# Patient Record
Sex: FEMALE | Race: WHITE | NOT HISPANIC OR LATINO | URBAN - METROPOLITAN AREA
[De-identification: names, ages, dates, MRNs, and addresses within clinical notes are randomized per-mention and may not be internally consistent; named-entity substitution may affect disease eponyms.]

---

## 2020-07-25 ENCOUNTER — TELEPHONE ENCOUNTER (OUTPATIENT)
Dept: URBAN - METROPOLITAN AREA CLINIC 13 | Facility: CLINIC | Age: 70
End: 2020-07-25

## 2020-07-25 RX ORDER — THYROID, PORCINE 60 MG/1
TAKE 1 TABLET BY MOUTH EVERY DAY TABLET ORAL
Qty: 30 | Refills: 0 | OUTPATIENT
Start: 2011-12-13 | End: 2016-02-29

## 2020-07-25 RX ORDER — POLYETHYLENE GLYCOL 3350, SODIUM CHLORIDE, SODIUM BICARBONATE AND POTASSIUM CHLORIDE WITH LEMON FLAVOR 420; 11.2; 5.72; 1.48 G/4L; G/4L; G/4L; G/4L
TAKE 1/2 GALLON AT 5:00 PM DAY BEFORE PROCEDURE, TAKE SECOND 1/2 OF GALLON 6 HRS PRIOR TO PROCEDURE POWDER, FOR SOLUTION ORAL
Qty: 1 | Refills: 0 | OUTPATIENT
Start: 2016-03-10 | End: 2016-03-21

## 2020-07-25 RX ORDER — THYROID, PORCINE 30 MG/1
TAKE 1 TABLET BY MOUTH EVERY DAY TABLET ORAL
Qty: 30 | Refills: 0 | OUTPATIENT
Start: 2011-06-14 | End: 2016-02-29

## 2020-07-26 ENCOUNTER — TELEPHONE ENCOUNTER (OUTPATIENT)
Dept: URBAN - METROPOLITAN AREA CLINIC 13 | Facility: CLINIC | Age: 70
End: 2020-07-26

## 2020-07-26 RX ORDER — ESTERIFIED ESTROGENS AND METHYLTESTOSTERONE .625; 1.25 MG/1; MG/1
TAKE 1 TABLET DAILY TABLET ORAL
Refills: 0 | Status: ACTIVE | COMMUNITY
Start: 2016-02-29

## 2020-07-26 RX ORDER — HYDROCHLOROTHIAZIDE 25 MG/1
TAKE 1 TABLET BY MOUTH EVERY DAY TABLET ORAL
Qty: 30 | Refills: 0 | Status: ACTIVE | COMMUNITY
Start: 2011-06-14

## 2020-07-26 RX ORDER — DICLOFENAC SODIUM 1 %
GEL (GRAM) TOPICAL
Qty: 255 | Refills: 0 | Status: ACTIVE | COMMUNITY
Start: 2011-12-13

## 2020-07-26 RX ORDER — HYDROCHLOROTHIAZIDE 12.5 MG/1
CAPSULE ORAL
Qty: 30 | Refills: 0 | Status: ACTIVE | COMMUNITY
Start: 2011-12-13

## 2020-07-26 RX ORDER — AZITHROMYCIN DIHYDRATE 250 MG/1
TAKE AS DIRECTED TABLET, FILM COATED ORAL
Qty: 6 | Refills: 0 | Status: ACTIVE | COMMUNITY
Start: 2011-06-14

## 2020-07-26 RX ORDER — ESOMEPRAZOLE MAGNESIUM 40 MG
TAKE ONE CAPSULE BY MOUTH EVERY DAY CAPSULE,DELAYED RELEASE (ENTERIC COATED) ORAL
Qty: 30 | Refills: 0 | Status: ACTIVE | COMMUNITY
Start: 2011-12-13

## 2020-07-26 RX ORDER — HYDROCODONE BITARTRATE AND HOMATROPINE METHYLBROMIDE 5; 1.5 MG/5ML; MG/5ML
SYRUP ORAL
Qty: 120 | Refills: 0 | Status: ACTIVE | COMMUNITY
Start: 2016-02-26

## 2020-07-26 RX ORDER — NITROFURANTOIN MONOHYDRATE/MACROCRYSTALLINE 25; 75 MG/1; MG/1
CAPSULE ORAL
Qty: 14 | Refills: 0 | Status: ACTIVE | COMMUNITY
Start: 2011-04-24

## 2020-07-26 RX ORDER — SIMVASTATIN 20 MG/1
TAKE 1 TABLET BY MOUTH AT BEDTIME TABLET, FILM COATED ORAL
Qty: 30 | Refills: 0 | Status: ACTIVE | COMMUNITY
Start: 2011-06-14

## 2020-07-26 RX ORDER — TRAMADOL HYDROCHLORIDE 50 MG/1
TABLET ORAL
Qty: 30 | Refills: 0 | Status: ACTIVE | COMMUNITY
Start: 2016-03-09

## 2020-07-26 RX ORDER — LEVOFLOXACIN 500 MG/1
TAKE 1 TABLET BY MOUTH EVERY DAY TABLET, FILM COATED ORAL
Qty: 10 | Refills: 0 | Status: ACTIVE | COMMUNITY
Start: 2011-12-13

## 2020-07-26 RX ORDER — ALPRAZOLAM 0.25 MG/1
TABLET ORAL
Qty: 15 | Refills: 0 | Status: ACTIVE | COMMUNITY
Start: 2011-12-24

## 2020-07-26 RX ORDER — ALBUTEROL SULFATE 90 UG/1
INHALE TWO PUFFS 4 TIMES A DAY AS NEEDED AEROSOL, METERED RESPIRATORY (INHALATION)
Qty: 9 | Refills: 0 | Status: ACTIVE | COMMUNITY
Start: 2011-06-14

## 2020-07-26 RX ORDER — THYROID, PORCINE 60 MG/1
TABLET ORAL
Qty: 30 | Refills: 0 | Status: ACTIVE | COMMUNITY
Start: 2011-06-14

## 2020-07-26 RX ORDER — THYROID, PORCINE 30 MG/1
TABLET ORAL
Qty: 30 | Refills: 0 | Status: ACTIVE | COMMUNITY
Start: 2011-06-14

## 2020-07-26 RX ORDER — PREDNISONE 20 MG/1
TABLET ORAL
Qty: 10 | Refills: 0 | Status: ACTIVE | COMMUNITY
Start: 2011-12-24

## 2020-07-26 RX ORDER — THYROID, PORCINE 60 MG/1
TAKE 2 TABLET DAILY TABLET ORAL
Refills: 0 | Status: ACTIVE | COMMUNITY
Start: 2016-02-29

## 2020-07-26 RX ORDER — ESOMEPRAZOLE MAGNESIUM 40 MG
CAPSULE,DELAYED RELEASE (ENTERIC COATED) ORAL
Qty: 30 | Refills: 0 | Status: ACTIVE | COMMUNITY
Start: 2011-06-14

## 2020-07-26 RX ORDER — SIMVASTATIN 20 MG/1
TABLET, FILM COATED ORAL
Qty: 30 | Refills: 0 | Status: ACTIVE | COMMUNITY
Start: 2011-12-13

## 2021-03-23 ENCOUNTER — TELEPHONE ENCOUNTER (OUTPATIENT)
Dept: URBAN - METROPOLITAN AREA CLINIC 113 | Facility: CLINIC | Age: 71
End: 2021-03-23

## 2021-03-25 ENCOUNTER — LAB OUTSIDE AN ENCOUNTER (OUTPATIENT)
Dept: URBAN - METROPOLITAN AREA CLINIC 113 | Facility: CLINIC | Age: 71
End: 2021-03-25

## 2021-03-25 ENCOUNTER — TELEPHONE ENCOUNTER (OUTPATIENT)
Dept: URBAN - METROPOLITAN AREA SURGERY CENTER 25 | Facility: SURGERY CENTER | Age: 71
End: 2021-03-25

## 2021-03-25 VITALS — WEIGHT: 199 LBS | HEIGHT: 63 IN | BODY MASS INDEX: 35.26 KG/M2

## 2021-03-25 RX ORDER — EZETIMIBE 10 MG
1 TABLET TABLET ORAL ONCE A DAY
Status: ACTIVE | COMMUNITY

## 2021-03-25 RX ORDER — TRAMADOL HYDROCHLORIDE 50 MG/1
TABLET ORAL
Qty: 30 | Refills: 0 | Status: DISCONTINUED | COMMUNITY
Start: 2016-03-09

## 2021-03-25 RX ORDER — SIMVASTATIN 20 MG/1
TAKE 1 TABLET BY MOUTH AT BEDTIME TABLET, FILM COATED ORAL
Qty: 30 | Refills: 0 | Status: DISCONTINUED | COMMUNITY
Start: 2011-06-14

## 2021-03-25 RX ORDER — THYROID, PORCINE 60 MG/1
TABLET ORAL
Qty: 30 | Refills: 0 | Status: ACTIVE | COMMUNITY
Start: 2011-06-14

## 2021-03-25 RX ORDER — DICLOFENAC SODIUM 1 %
GEL (GRAM) TOPICAL
Qty: 255 | Refills: 0 | Status: DISCONTINUED | COMMUNITY
Start: 2011-12-13

## 2021-03-25 RX ORDER — ALPRAZOLAM 0.25 MG/1
TABLET ORAL
Qty: 15 | Refills: 0 | Status: ACTIVE | COMMUNITY
Start: 2011-12-24

## 2021-03-25 RX ORDER — HYDROCHLOROTHIAZIDE 12.5 MG/1
CAPSULE ORAL
Qty: 30 | Refills: 0 | Status: ACTIVE | COMMUNITY
Start: 2011-12-13

## 2021-03-25 RX ORDER — LEVOFLOXACIN 500 MG/1
TAKE 1 TABLET BY MOUTH EVERY DAY TABLET, FILM COATED ORAL
Qty: 10 | Refills: 0 | Status: ACTIVE | COMMUNITY
Start: 2011-12-13

## 2021-03-25 RX ORDER — HYDROCODONE BITARTRATE AND HOMATROPINE METHYLBROMIDE 5; 1.5 MG/5ML; MG/5ML
SYRUP ORAL
Qty: 120 | Refills: 0 | Status: DISCONTINUED | COMMUNITY
Start: 2016-02-26

## 2021-03-25 RX ORDER — PREDNISONE 20 MG/1
TABLET ORAL
Qty: 10 | Refills: 0 | Status: ACTIVE | COMMUNITY
Start: 2011-12-24

## 2021-03-25 RX ORDER — ESOMEPRAZOLE MAGNESIUM 40 MG
CAPSULE,DELAYED RELEASE (ENTERIC COATED) ORAL
Qty: 30 | Refills: 0 | Status: ACTIVE | COMMUNITY
Start: 2011-06-14

## 2021-03-25 RX ORDER — THYROID, PORCINE 30 MG/1
TABLET ORAL
Qty: 30 | Refills: 0 | Status: ACTIVE | COMMUNITY
Start: 2011-06-14

## 2021-03-25 RX ORDER — ALBUTEROL SULFATE 90 UG/1
INHALE TWO PUFFS 4 TIMES A DAY AS NEEDED AEROSOL, METERED RESPIRATORY (INHALATION)
Qty: 9 | Refills: 0 | Status: ACTIVE | COMMUNITY
Start: 2011-06-14

## 2021-03-25 RX ORDER — AZITHROMYCIN DIHYDRATE 250 MG/1
TAKE AS DIRECTED TABLET, FILM COATED ORAL
Qty: 6 | Refills: 0 | Status: ACTIVE | COMMUNITY
Start: 2011-06-14

## 2021-03-25 RX ORDER — SODIUM PICOSULFATE, MAGNESIUM OXIDE, AND ANHYDROUS CITRIC ACID 10; 3.5; 12 MG/160ML; G/160ML; G/160ML
160ML LIQUID ORAL ONCE
Qty: 160 ML | Refills: 0 | OUTPATIENT
Start: 2021-03-25 | End: 2021-03-26

## 2021-03-25 RX ORDER — NITROFURANTOIN MONOHYDRATE/MACROCRYSTALLINE 25; 75 MG/1; MG/1
CAPSULE ORAL
Qty: 14 | Refills: 0 | Status: ACTIVE | COMMUNITY
Start: 2011-04-24

## 2021-03-25 RX ORDER — HYDROCHLOROTHIAZIDE 25 MG/1
TAKE 1 TABLET BY MOUTH EVERY DAY TABLET ORAL
Qty: 30 | Refills: 0 | Status: ACTIVE | COMMUNITY
Start: 2011-06-14

## 2021-03-25 RX ORDER — ESTERIFIED ESTROGENS AND METHYLTESTOSTERONE .625; 1.25 MG/1; MG/1
TAKE 1 TABLET DAILY TABLET ORAL
Refills: 0 | Status: ACTIVE | COMMUNITY
Start: 2016-02-29

## 2021-04-04 ENCOUNTER — ERX REFILL RESPONSE (OUTPATIENT)
Dept: URBAN - METROPOLITAN AREA SURGERY CENTER 25 | Facility: SURGERY CENTER | Age: 71
End: 2021-04-04

## 2021-04-04 RX ORDER — SODIUM PICOSULFATE, MAGNESIUM OXIDE, AND ANHYDROUS CITRIC ACID 10; 3.5; 12 MG/160ML; G/160ML; G/160ML
160ML LIQUID ORAL ONCE
Qty: 320 | Refills: 0 | OUTPATIENT

## 2021-04-04 RX ORDER — SODIUM SULFATE, POTASSIUM SULFATE, MAGNESIUM SULFATE 17.5; 3.13; 1.6 G/ML; G/ML; G/ML
USE AS DIRECTED SOLUTION, CONCENTRATE ORAL
Qty: 1 MILLILITER | Refills: 0 | OUTPATIENT

## 2021-04-20 ENCOUNTER — CLAIMS CREATED FROM THE CLAIM WINDOW (OUTPATIENT)
Dept: URBAN - METROPOLITAN AREA CLINIC 4 | Facility: CLINIC | Age: 71
End: 2021-04-20
Payer: MEDICARE

## 2021-04-20 ENCOUNTER — OFFICE VISIT (OUTPATIENT)
Dept: URBAN - METROPOLITAN AREA SURGERY CENTER 25 | Facility: SURGERY CENTER | Age: 71
End: 2021-04-20
Payer: MEDICARE

## 2021-04-20 DIAGNOSIS — D12.3 ADENOMA OF TRANSVERSE COLON: ICD-10-CM

## 2021-04-20 DIAGNOSIS — D12.3 BENIGN NEOPLASM OF TRANSVERSE COLON: ICD-10-CM

## 2021-04-20 DIAGNOSIS — Z86.010 H/O ADENOMATOUS POLYP OF COLON: ICD-10-CM

## 2021-04-20 PROCEDURE — 45385 COLONOSCOPY W/LESION REMOVAL: CPT | Performed by: INTERNAL MEDICINE

## 2021-04-20 PROCEDURE — 45380 COLONOSCOPY AND BIOPSY: CPT | Performed by: INTERNAL MEDICINE

## 2021-04-20 PROCEDURE — 88305 TISSUE EXAM BY PATHOLOGIST: CPT | Performed by: PATHOLOGY

## 2021-04-20 PROCEDURE — G8907 PT DOC NO EVENTS ON DISCHARG: HCPCS | Performed by: INTERNAL MEDICINE

## 2021-04-20 RX ORDER — ALPRAZOLAM 0.25 MG/1
TABLET ORAL
Qty: 15 | Refills: 0 | Status: ACTIVE | COMMUNITY
Start: 2011-12-24

## 2021-04-20 RX ORDER — NITROFURANTOIN MONOHYDRATE/MACROCRYSTALLINE 25; 75 MG/1; MG/1
CAPSULE ORAL
Qty: 14 | Refills: 0 | Status: ACTIVE | COMMUNITY
Start: 2011-04-24

## 2021-04-20 RX ORDER — THYROID, PORCINE 60 MG/1
TABLET ORAL
Qty: 30 | Refills: 0 | Status: ACTIVE | COMMUNITY
Start: 2011-06-14

## 2021-04-20 RX ORDER — HYDROCHLOROTHIAZIDE 25 MG/1
TAKE 1 TABLET BY MOUTH EVERY DAY TABLET ORAL
Qty: 30 | Refills: 0 | Status: ACTIVE | COMMUNITY
Start: 2011-06-14

## 2021-04-20 RX ORDER — AZITHROMYCIN DIHYDRATE 250 MG/1
TAKE AS DIRECTED TABLET, FILM COATED ORAL
Qty: 6 | Refills: 0 | Status: ACTIVE | COMMUNITY
Start: 2011-06-14

## 2021-04-20 RX ORDER — EZETIMIBE 10 MG
1 TABLET TABLET ORAL ONCE A DAY
Status: ACTIVE | COMMUNITY

## 2021-04-20 RX ORDER — ALBUTEROL SULFATE 90 UG/1
INHALE TWO PUFFS 4 TIMES A DAY AS NEEDED AEROSOL, METERED RESPIRATORY (INHALATION)
Qty: 9 | Refills: 0 | Status: ACTIVE | COMMUNITY
Start: 2011-06-14

## 2021-04-20 RX ORDER — LEVOFLOXACIN 500 MG/1
TAKE 1 TABLET BY MOUTH EVERY DAY TABLET, FILM COATED ORAL
Qty: 10 | Refills: 0 | Status: ACTIVE | COMMUNITY
Start: 2011-12-13

## 2021-04-20 RX ORDER — ESOMEPRAZOLE MAGNESIUM 40 MG
CAPSULE,DELAYED RELEASE (ENTERIC COATED) ORAL
Qty: 30 | Refills: 0 | Status: ACTIVE | COMMUNITY
Start: 2011-06-14

## 2021-04-20 RX ORDER — SODIUM SULFATE, POTASSIUM SULFATE, MAGNESIUM SULFATE 17.5; 3.13; 1.6 G/ML; G/ML; G/ML
USE AS DIRECTED SOLUTION, CONCENTRATE ORAL
Qty: 1 MILLILITER | Refills: 0 | Status: ACTIVE | COMMUNITY

## 2021-04-20 RX ORDER — HYDROCHLOROTHIAZIDE 12.5 MG/1
CAPSULE ORAL
Qty: 30 | Refills: 0 | Status: ACTIVE | COMMUNITY
Start: 2011-12-13

## 2021-04-20 RX ORDER — THYROID, PORCINE 30 MG/1
TABLET ORAL
Qty: 30 | Refills: 0 | Status: ACTIVE | COMMUNITY
Start: 2011-06-14

## 2021-04-20 RX ORDER — PREDNISONE 20 MG/1
TABLET ORAL
Qty: 10 | Refills: 0 | Status: ACTIVE | COMMUNITY
Start: 2011-12-24

## 2021-04-20 RX ORDER — ESTERIFIED ESTROGENS AND METHYLTESTOSTERONE .625; 1.25 MG/1; MG/1
TAKE 1 TABLET DAILY TABLET ORAL
Refills: 0 | Status: ACTIVE | COMMUNITY
Start: 2016-02-29

## 2021-05-12 ENCOUNTER — TELEPHONE ENCOUNTER (OUTPATIENT)
Dept: URBAN - METROPOLITAN AREA CLINIC 113 | Facility: CLINIC | Age: 71
End: 2021-05-12

## 2021-05-12 RX ORDER — SODIUM SULFATE, POTASSIUM SULFATE, MAGNESIUM SULFATE 17.5; 3.13; 1.6 G/ML; G/ML; G/ML
USE AS DIRECTED SOLUTION, CONCENTRATE ORAL
Qty: 1 MILLILITER | Refills: 0

## 2021-05-14 ENCOUNTER — OFFICE VISIT (OUTPATIENT)
Dept: URBAN - METROPOLITAN AREA CLINIC 107 | Facility: CLINIC | Age: 71
End: 2021-05-14
Payer: MEDICARE

## 2021-05-14 ENCOUNTER — WEB ENCOUNTER (OUTPATIENT)
Dept: URBAN - METROPOLITAN AREA CLINIC 107 | Facility: CLINIC | Age: 71
End: 2021-05-14

## 2021-05-14 VITALS
RESPIRATION RATE: 18 BRPM | HEART RATE: 83 BPM | DIASTOLIC BLOOD PRESSURE: 96 MMHG | HEIGHT: 63 IN | BODY MASS INDEX: 39.87 KG/M2 | TEMPERATURE: 98.3 F | SYSTOLIC BLOOD PRESSURE: 134 MMHG | WEIGHT: 225 LBS

## 2021-05-14 DIAGNOSIS — Z86.010 HISTORY OF COLONIC POLYPS: ICD-10-CM

## 2021-05-14 DIAGNOSIS — K21.9 GASTROESOPHAGEAL REFLUX DISEASE, UNSPECIFIED WHETHER ESOPHAGITIS PRESENT: ICD-10-CM

## 2021-05-14 DIAGNOSIS — R10.13 EPIGASTRIC ABDOMINAL PAIN: ICD-10-CM

## 2021-05-14 PROCEDURE — 99212 OFFICE O/P EST SF 10 MIN: CPT | Performed by: INTERNAL MEDICINE

## 2021-05-14 RX ORDER — ALPRAZOLAM 0.25 MG/1
TABLET ORAL
Qty: 15 | Refills: 0 | Status: ACTIVE | COMMUNITY
Start: 2011-12-24

## 2021-05-14 RX ORDER — ESOMEPRAZOLE MAGNESIUM 40 MG
CAPSULE,DELAYED RELEASE (ENTERIC COATED) ORAL
Qty: 30 | Refills: 0 | Status: ACTIVE | COMMUNITY
Start: 2011-06-14

## 2021-05-14 RX ORDER — LEVOFLOXACIN 500 MG/1
TAKE 1 TABLET BY MOUTH EVERY DAY TABLET, FILM COATED ORAL
Qty: 10 | Refills: 0 | Status: ACTIVE | COMMUNITY
Start: 2011-12-13

## 2021-05-14 RX ORDER — ESTERIFIED ESTROGENS AND METHYLTESTOSTERONE .625; 1.25 MG/1; MG/1
TAKE 1 TABLET DAILY TABLET ORAL
Refills: 0 | Status: ACTIVE | COMMUNITY
Start: 2016-02-29

## 2021-05-14 RX ORDER — NITROFURANTOIN MONOHYDRATE/MACROCRYSTALLINE 25; 75 MG/1; MG/1
CAPSULE ORAL
Qty: 14 | Refills: 0 | Status: ACTIVE | COMMUNITY
Start: 2011-04-24

## 2021-05-14 RX ORDER — HYDROCHLOROTHIAZIDE 25 MG/1
TAKE 1 TABLET BY MOUTH EVERY DAY TABLET ORAL
Qty: 30 | Refills: 0 | Status: ACTIVE | COMMUNITY
Start: 2011-06-14

## 2021-05-14 RX ORDER — SODIUM SULFATE, POTASSIUM SULFATE, MAGNESIUM SULFATE 17.5; 3.13; 1.6 G/ML; G/ML; G/ML
USE AS DIRECTED SOLUTION, CONCENTRATE ORAL
Qty: 1 MILLILITER | Refills: 0 | Status: ACTIVE | COMMUNITY

## 2021-05-14 RX ORDER — THYROID, PORCINE 30 MG/1
TABLET ORAL
Qty: 30 | Refills: 0 | Status: ACTIVE | COMMUNITY
Start: 2011-06-14

## 2021-05-14 RX ORDER — PREDNISONE 20 MG/1
TABLET ORAL
Qty: 10 | Refills: 0 | Status: ACTIVE | COMMUNITY
Start: 2011-12-24

## 2021-05-14 RX ORDER — THYROID, PORCINE 60 MG/1
TABLET ORAL
Qty: 30 | Refills: 0 | Status: ACTIVE | COMMUNITY
Start: 2011-06-14

## 2021-05-14 RX ORDER — AZITHROMYCIN DIHYDRATE 250 MG/1
TAKE AS DIRECTED TABLET, FILM COATED ORAL
Qty: 6 | Refills: 0 | Status: ACTIVE | COMMUNITY
Start: 2011-06-14

## 2021-05-14 RX ORDER — ALBUTEROL SULFATE 90 UG/1
INHALE TWO PUFFS 4 TIMES A DAY AS NEEDED AEROSOL, METERED RESPIRATORY (INHALATION)
Qty: 9 | Refills: 0 | Status: ACTIVE | COMMUNITY
Start: 2011-06-14

## 2021-05-14 RX ORDER — EZETIMIBE 10 MG
1 TABLET TABLET ORAL ONCE A DAY
Status: ACTIVE | COMMUNITY

## 2021-05-14 RX ORDER — HYDROCHLOROTHIAZIDE 12.5 MG/1
CAPSULE ORAL
Qty: 30 | Refills: 0 | Status: ACTIVE | COMMUNITY
Start: 2011-12-13

## 2021-05-14 NOTE — HPI-TODAY'S VISIT:
The patient presents for follow-up after recent colonoscopy.  Colonoscopy (4/20/2021): Diverticulosis in the sigmoid colon, in the descending colon and in the transverse colon.  The examined portion of the ileum was normal.  One 3 mm polyp in the transverse colon.Two 5 to 6 mm polyps in the transverse colon.  Nonbleeding internal hemorrhoids.  Normal examination on direct and retroflexion views. Biopsies notable for tubular adenomas. Repeat colonoscopy in 5 years.   She has no complaints today.  She has noted some intermittent problems with epigastric abdominal pain which occurred when she was on a daily diet.  She describes this as a consistent epigastric discomfort lasting up to 4 days at a time for resolving.  It is not burning, and she does not feel it is consistent with her prior reflux history.  She has had reflux for years and is maintained on PPI therapy for this, and this syndrome is distinctly different.  She is currently asymptomatic.

## 2021-05-16 PROBLEM — 428283002: Status: ACTIVE | Noted: 2021-05-14

## 2022-08-30 ENCOUNTER — OFFICE VISIT (OUTPATIENT)
Dept: URBAN - METROPOLITAN AREA CLINIC 107 | Facility: CLINIC | Age: 72
End: 2022-08-30
Payer: MEDICARE

## 2022-08-30 ENCOUNTER — LAB OUTSIDE AN ENCOUNTER (OUTPATIENT)
Dept: URBAN - METROPOLITAN AREA CLINIC 107 | Facility: CLINIC | Age: 72
End: 2022-08-30

## 2022-08-30 VITALS
SYSTOLIC BLOOD PRESSURE: 146 MMHG | TEMPERATURE: 97.6 F | WEIGHT: 227.8 LBS | HEART RATE: 85 BPM | DIASTOLIC BLOOD PRESSURE: 95 MMHG | BODY MASS INDEX: 40.36 KG/M2 | HEIGHT: 63 IN

## 2022-08-30 DIAGNOSIS — R68.81 EARLY SATIETY: ICD-10-CM

## 2022-08-30 DIAGNOSIS — R10.13 EPIGASTRIC ABDOMINAL PAIN: ICD-10-CM

## 2022-08-30 PROCEDURE — 99214 OFFICE O/P EST MOD 30 MIN: CPT | Performed by: INTERNAL MEDICINE

## 2022-08-30 RX ORDER — ESTERIFIED ESTROGENS AND METHYLTESTOSTERONE .625; 1.25 MG/1; MG/1
TAKE 1 TABLET DAILY TABLET ORAL
Refills: 0 | Status: ACTIVE | COMMUNITY
Start: 2016-02-29

## 2022-08-30 RX ORDER — SODIUM SULFATE, POTASSIUM SULFATE, MAGNESIUM SULFATE 17.5; 3.13; 1.6 G/ML; G/ML; G/ML
USE AS DIRECTED SOLUTION, CONCENTRATE ORAL
Qty: 1 MILLILITER | Refills: 0 | Status: ACTIVE | COMMUNITY

## 2022-08-30 RX ORDER — ESOMEPRAZOLE MAGNESIUM 40 MG
CAPSULE,DELAYED RELEASE (ENTERIC COATED) ORAL
Qty: 30 | Refills: 0 | Status: ACTIVE | COMMUNITY
Start: 2011-06-14

## 2022-08-30 RX ORDER — EZETIMIBE 10 MG
1 TABLET TABLET ORAL ONCE A DAY
Status: ACTIVE | COMMUNITY

## 2022-08-30 RX ORDER — ALPRAZOLAM 0.25 MG/1
TABLET ORAL
Qty: 15 | Refills: 0 | Status: ACTIVE | COMMUNITY
Start: 2011-12-24

## 2022-08-30 RX ORDER — LEVOTHYROXINE SODIUM 137 UG/1
1 TABLET IN THE MORNING ON AN EMPTY STOMACH TABLET ORAL ONCE A DAY
Status: ACTIVE | COMMUNITY

## 2022-08-30 RX ORDER — AZITHROMYCIN DIHYDRATE 250 MG/1
TAKE AS DIRECTED TABLET, FILM COATED ORAL
Qty: 6 | Refills: 0 | Status: ACTIVE | COMMUNITY
Start: 2011-06-14

## 2022-08-30 RX ORDER — NITROFURANTOIN MONOHYDRATE/MACROCRYSTALLINE 25; 75 MG/1; MG/1
CAPSULE ORAL
Qty: 14 | Refills: 0 | Status: ACTIVE | COMMUNITY
Start: 2011-04-24

## 2022-08-30 RX ORDER — HYDROCHLOROTHIAZIDE 25 MG/1
TAKE 1 TABLET BY MOUTH EVERY DAY TABLET ORAL
Qty: 30 | Refills: 0 | Status: ACTIVE | COMMUNITY
Start: 2011-06-14

## 2022-08-30 RX ORDER — LEVOFLOXACIN 500 MG/1
TAKE 1 TABLET BY MOUTH EVERY DAY TABLET, FILM COATED ORAL
Qty: 10 | Refills: 0 | Status: ACTIVE | COMMUNITY
Start: 2011-12-13

## 2022-08-30 RX ORDER — HYDROCHLOROTHIAZIDE 12.5 MG/1
CAPSULE ORAL
Qty: 30 | Refills: 0 | Status: ACTIVE | COMMUNITY
Start: 2011-12-13

## 2022-08-30 RX ORDER — PREDNISONE 20 MG/1
TABLET ORAL
Qty: 10 | Refills: 0 | Status: ACTIVE | COMMUNITY
Start: 2011-12-24

## 2022-08-30 RX ORDER — ALBUTEROL SULFATE 90 UG/1
INHALE TWO PUFFS 4 TIMES A DAY AS NEEDED AEROSOL, METERED RESPIRATORY (INHALATION)
Qty: 9 | Refills: 0 | Status: ACTIVE | COMMUNITY
Start: 2011-06-14

## 2022-08-30 NOTE — EXAM-FUNCTIONAL ASSESSMENT
General--no acute distress Eyes--anicteric HENT--normocephalic, atraumatic head Neck--no lymphadenopathy Chest--normal breath sounds Heart--regular rate and rhythm Abdomen--soft, mildly tender epigastrium, non distended, bowel sounds present Musculoskeletal--normal gait and station Skin--no rashes Neurologic--Alert and oriented x 3 Psychiatric--stable mood, appropriate affect

## 2022-08-30 NOTE — HPI-TODAY'S VISIT:
Ms. Pineda is a 72-year-old female who was last seen here after a surveillance colonoscopy on May 14, 2021.  At that time, she had some intermittent epigastric abdominal pain which had improved recently.  Reflux symptoms were controlled on PPI therapy.  She made a distinction between the epigastric pain symptoms and the reflux symptoms, which did not seem to be the same process.  However, as her epigastric discomfort had resolved, she did not seek further evaluation of it.   Colonoscopy (4/20/2021): Diverticulosis in the sigmoid colon, in the descending colon and in the transverse colon.  The examined portion of the ileum was normal.  One 3 mm polyp in the transverse colon.Two 5 to 6 mm polyps in the transverse colon.  Nonbleeding internal hemorrhoids.  Normal examination on direct and retroflexion views. Biopsies notable for tubular adenomas. Repeat colonoscopy in 5 years.  Returns today with intermittent epigastric abdominal pain and early satiety.  She denies dysphagia or odynophagia.  She denies any chest pain or weight loss.  She has had no fever, chills, sweats, no jaundice, no dark urine or light stools.  She does take Aleve occasionally.  She also was found that her discomfort can be exacerbated by the ingestion of sugar, Posta, and coffee.  She is able to tolerate toast, eggs, and other bland foods.  She denies any dysphagia or odynophagia.  She tells me that she had an upper GI series performed at the insistence of Dr. Bravo, her PCP, and this was reportedly negative.  She had an upper endoscopy in the past, not recently.  She has lost no weight.  She does complain of intermittent bouts of episodic tachycardia, associated with breathlessness.  Her heart rate has been in the 120s.  His episodes last for up to half an hour each.  She denies associated chest pain.  Neither of the long episodes that she noted in this regard were related to physical exertion.  Both occurred at rest.

## 2022-10-05 ENCOUNTER — WEB ENCOUNTER (OUTPATIENT)
Dept: URBAN - METROPOLITAN AREA SURGERY CENTER 25 | Facility: SURGERY CENTER | Age: 72
End: 2022-10-05

## 2022-10-11 ENCOUNTER — CLAIMS CREATED FROM THE CLAIM WINDOW (OUTPATIENT)
Dept: URBAN - METROPOLITAN AREA CLINIC 4 | Facility: CLINIC | Age: 72
End: 2022-10-11
Payer: MEDICARE

## 2022-10-11 ENCOUNTER — CLAIMS CREATED FROM THE CLAIM WINDOW (OUTPATIENT)
Dept: URBAN - METROPOLITAN AREA SURGERY CENTER 25 | Facility: SURGERY CENTER | Age: 72
End: 2022-10-11

## 2022-10-11 ENCOUNTER — CLAIMS CREATED FROM THE CLAIM WINDOW (OUTPATIENT)
Dept: URBAN - METROPOLITAN AREA SURGERY CENTER 25 | Facility: SURGERY CENTER | Age: 72
End: 2022-10-11
Payer: MEDICARE

## 2022-10-11 DIAGNOSIS — R68.81 EARLY SATIETY: ICD-10-CM

## 2022-10-11 DIAGNOSIS — R10.13 EPIGASTRIC ABDOMINAL PAIN: ICD-10-CM

## 2022-10-11 DIAGNOSIS — K31.7 POLYP OF STOMACH FUNDIC GLAND: ICD-10-CM

## 2022-10-11 DIAGNOSIS — K31.89 OTHER DISEASES OF STOMACH AND DUODENUM: ICD-10-CM

## 2022-10-11 DIAGNOSIS — K29.70 GASTRITIS, UNSPECIFIED, WITHOUT BLEEDING: ICD-10-CM

## 2022-10-11 PROCEDURE — 88305 TISSUE EXAM BY PATHOLOGIST: CPT | Performed by: PATHOLOGY

## 2022-10-11 PROCEDURE — 43239 EGD BIOPSY SINGLE/MULTIPLE: CPT | Performed by: INTERNAL MEDICINE

## 2022-10-11 PROCEDURE — G8907 PT DOC NO EVENTS ON DISCHARG: HCPCS | Performed by: INTERNAL MEDICINE

## 2022-10-11 PROCEDURE — 88312 SPECIAL STAINS GROUP 1: CPT | Performed by: PATHOLOGY

## 2022-10-11 RX ORDER — LEVOTHYROXINE SODIUM 137 UG/1
1 TABLET IN THE MORNING ON AN EMPTY STOMACH TABLET ORAL ONCE A DAY
Status: ACTIVE | COMMUNITY

## 2022-10-11 RX ORDER — LEVOFLOXACIN 500 MG/1
TAKE 1 TABLET BY MOUTH EVERY DAY TABLET, FILM COATED ORAL
Qty: 10 | Refills: 0 | Status: ACTIVE | COMMUNITY
Start: 2011-12-13

## 2022-10-11 RX ORDER — ESTERIFIED ESTROGENS AND METHYLTESTOSTERONE .625; 1.25 MG/1; MG/1
TAKE 1 TABLET DAILY TABLET ORAL
Refills: 0 | Status: ACTIVE | COMMUNITY
Start: 2016-02-29

## 2022-10-11 RX ORDER — AZITHROMYCIN DIHYDRATE 250 MG/1
TAKE AS DIRECTED TABLET, FILM COATED ORAL
Qty: 6 | Refills: 0 | Status: ACTIVE | COMMUNITY
Start: 2011-06-14

## 2022-10-11 RX ORDER — ESOMEPRAZOLE MAGNESIUM 40 MG
CAPSULE,DELAYED RELEASE (ENTERIC COATED) ORAL
Qty: 30 | Refills: 0 | Status: ACTIVE | COMMUNITY
Start: 2011-06-14

## 2022-10-11 RX ORDER — NITROFURANTOIN MONOHYDRATE/MACROCRYSTALLINE 25; 75 MG/1; MG/1
CAPSULE ORAL
Qty: 14 | Refills: 0 | Status: ACTIVE | COMMUNITY
Start: 2011-04-24

## 2022-10-11 RX ORDER — PREDNISONE 20 MG/1
TABLET ORAL
Qty: 10 | Refills: 0 | Status: ACTIVE | COMMUNITY
Start: 2011-12-24

## 2022-10-11 RX ORDER — ALBUTEROL SULFATE 90 UG/1
INHALE TWO PUFFS 4 TIMES A DAY AS NEEDED AEROSOL, METERED RESPIRATORY (INHALATION)
Qty: 9 | Refills: 0 | Status: ACTIVE | COMMUNITY
Start: 2011-06-14

## 2022-10-11 RX ORDER — SODIUM SULFATE, POTASSIUM SULFATE, MAGNESIUM SULFATE 17.5; 3.13; 1.6 G/ML; G/ML; G/ML
USE AS DIRECTED SOLUTION, CONCENTRATE ORAL
Qty: 1 MILLILITER | Refills: 0 | Status: ACTIVE | COMMUNITY

## 2022-10-11 RX ORDER — HYDROCHLOROTHIAZIDE 25 MG/1
TAKE 1 TABLET BY MOUTH EVERY DAY TABLET ORAL
Qty: 30 | Refills: 0 | Status: ACTIVE | COMMUNITY
Start: 2011-06-14

## 2022-10-11 RX ORDER — HYDROCHLOROTHIAZIDE 12.5 MG/1
CAPSULE ORAL
Qty: 30 | Refills: 0 | Status: ACTIVE | COMMUNITY
Start: 2011-12-13

## 2022-10-11 RX ORDER — ALPRAZOLAM 0.25 MG/1
TABLET ORAL
Qty: 15 | Refills: 0 | Status: ACTIVE | COMMUNITY
Start: 2011-12-24

## 2022-10-11 RX ORDER — EZETIMIBE 10 MG
1 TABLET TABLET ORAL ONCE A DAY
Status: ACTIVE | COMMUNITY

## 2022-10-12 ENCOUNTER — TELEPHONE ENCOUNTER (OUTPATIENT)
Dept: URBAN - METROPOLITAN AREA CLINIC 113 | Facility: CLINIC | Age: 72
End: 2022-10-12

## 2022-10-24 ENCOUNTER — TELEPHONE ENCOUNTER (OUTPATIENT)
Dept: URBAN - METROPOLITAN AREA CLINIC 113 | Facility: CLINIC | Age: 72
End: 2022-10-24

## 2022-11-04 PROBLEM — 92411005 BENIGN NEOPLASM OF STOMACH: Status: ACTIVE | Noted: 2022-11-04

## 2022-11-08 ENCOUNTER — OFFICE VISIT (OUTPATIENT)
Dept: URBAN - METROPOLITAN AREA CLINIC 107 | Facility: CLINIC | Age: 72
End: 2022-11-08
Payer: MEDICARE

## 2022-11-08 VITALS
TEMPERATURE: 98.3 F | HEIGHT: 63 IN | WEIGHT: 226 LBS | DIASTOLIC BLOOD PRESSURE: 85 MMHG | BODY MASS INDEX: 40.04 KG/M2 | HEART RATE: 78 BPM | SYSTOLIC BLOOD PRESSURE: 121 MMHG

## 2022-11-08 DIAGNOSIS — R10.13 EPIGASTRIC ABDOMINAL PAIN: ICD-10-CM

## 2022-11-08 DIAGNOSIS — R68.81 EARLY SATIETY: ICD-10-CM

## 2022-11-08 DIAGNOSIS — R10.11 RUQ PAIN: ICD-10-CM

## 2022-11-08 PROCEDURE — 99214 OFFICE O/P EST MOD 30 MIN: CPT | Performed by: INTERNAL MEDICINE

## 2022-11-08 RX ORDER — AZITHROMYCIN DIHYDRATE 250 MG/1
TAKE AS DIRECTED TABLET, FILM COATED ORAL
Qty: 6 | Refills: 0 | Status: ACTIVE | COMMUNITY
Start: 2011-06-14

## 2022-11-08 RX ORDER — LEVOTHYROXINE SODIUM 137 UG/1
1 TABLET IN THE MORNING ON AN EMPTY STOMACH TABLET ORAL ONCE A DAY
Status: ACTIVE | COMMUNITY

## 2022-11-08 RX ORDER — HYDROCHLOROTHIAZIDE 25 MG/1
TAKE 1 TABLET BY MOUTH EVERY DAY TABLET ORAL
Qty: 30 | Refills: 0 | Status: ACTIVE | COMMUNITY
Start: 2011-06-14

## 2022-11-08 RX ORDER — ESOMEPRAZOLE MAGNESIUM 40 MG
CAPSULE,DELAYED RELEASE (ENTERIC COATED) ORAL
Qty: 30 | Refills: 0 | Status: ACTIVE | COMMUNITY
Start: 2011-06-14

## 2022-11-08 RX ORDER — ESTERIFIED ESTROGENS AND METHYLTESTOSTERONE .625; 1.25 MG/1; MG/1
TAKE 1 TABLET DAILY TABLET ORAL
Refills: 0 | Status: ACTIVE | COMMUNITY
Start: 2016-02-29

## 2022-11-08 RX ORDER — NITROFURANTOIN MONOHYDRATE/MACROCRYSTALLINE 25; 75 MG/1; MG/1
CAPSULE ORAL
Qty: 14 | Refills: 0 | Status: ACTIVE | COMMUNITY
Start: 2011-04-24

## 2022-11-08 RX ORDER — SODIUM SULFATE, POTASSIUM SULFATE, MAGNESIUM SULFATE 17.5; 3.13; 1.6 G/ML; G/ML; G/ML
USE AS DIRECTED SOLUTION, CONCENTRATE ORAL
Qty: 1 MILLILITER | Refills: 0 | Status: ON HOLD | COMMUNITY

## 2022-11-08 RX ORDER — HYDROCHLOROTHIAZIDE 12.5 MG/1
CAPSULE ORAL
Qty: 30 | Refills: 0 | Status: ACTIVE | COMMUNITY
Start: 2011-12-13

## 2022-11-08 RX ORDER — EZETIMIBE 10 MG
1 TABLET TABLET ORAL ONCE A DAY
Status: ACTIVE | COMMUNITY

## 2022-11-08 RX ORDER — LEVOFLOXACIN 500 MG/1
TAKE 1 TABLET BY MOUTH EVERY DAY TABLET, FILM COATED ORAL
Qty: 10 | Refills: 0 | Status: ACTIVE | COMMUNITY
Start: 2011-12-13

## 2022-11-08 RX ORDER — SUCRALFATE 1 G/1
1 TABLET ON AN EMPTY STOMACH TABLET ORAL TWICE A DAY
Qty: 60 | OUTPATIENT
Start: 2022-11-08 | End: 2022-12-08

## 2022-11-08 RX ORDER — PREDNISONE 20 MG/1
TABLET ORAL
Qty: 10 | Refills: 0 | Status: ACTIVE | COMMUNITY
Start: 2011-12-24

## 2022-11-08 RX ORDER — ALBUTEROL SULFATE 90 UG/1
INHALE TWO PUFFS 4 TIMES A DAY AS NEEDED AEROSOL, METERED RESPIRATORY (INHALATION)
Qty: 9 | Refills: 0 | Status: ACTIVE | COMMUNITY
Start: 2011-06-14

## 2022-11-08 RX ORDER — ALPRAZOLAM 0.25 MG/1
TABLET ORAL
Qty: 15 | Refills: 0 | Status: ACTIVE | COMMUNITY
Start: 2011-12-24

## 2022-11-08 NOTE — HPI-TODAY'S VISIT:
Ms. Pineda is a 72-year-old female who was last seen here on 8/30/22.  She was previously seen here after a surveillance colonoscopy on May 14, 2021.  At that time, she had some intermittent epigastric abdominal pain which had improved recently.  Reflux symptoms were controlled on PPI therapy.  She made a distinction between the epigastric pain symptoms and the reflux symptoms, which did not seem to be the same process.  However, as her epigastric discomfort had resolved, she did not seek further evaluation of it.   Colonoscopy (4/20/2021): Diverticulosis in the sigmoid colon, in the descending colon and in the transverse colon.  The examined portion of the ileum was normal.  One 3 mm polyp in the transverse colon.Two 5 to 6 mm polyps in the transverse colon.  Nonbleeding internal hemorrhoids.  Normal examination on direct and retroflexion views. Biopsies notable for tubular adenomas. Repeat colonoscopy in 5 years.  She was seen on 8/30/22 with intermittent epigastric abdominal pain and early satiety.  She had an upper GI series performed at the insistence of Dr. Bravo which was reportedly negative.  She had an upper endoscopy in the distant past, not recently.  She had lost no weight.  She did complain of intermittent bouts of episodic tachycardia, associated with breathlessness.  Her heart rate has been in the 120s.  His episodes last for up to half an hour each.  She denied associated chest pain.  Neither of the long episodes that she noted in this regard were related to physical exertion.   After her last visit, she had labs done, including a complete metabolic panel, CBC, and lipase, all of which were apparently not drawn.  She did have an upper endoscopy done on October 11, 2022 which showed a small hiatal hernia and gastritis.  Biopsies were negative for H. pylori.  She had some gastric polyps which were fundic gland polyps.  She had a gastric emptying study done on October 21, 2022 which showed mildly delayed gastric emptying, with 75% emptying in 3 hours.  The patient is continued to be somewhat symptomatic.  She has no pain when she does not eat, but ingestion of vegetables and salads will increase her symptoms.  She did have her tachycardic episodes evaluated by cardiologist, although I do not have a report of this.

## 2022-11-09 ENCOUNTER — TELEPHONE ENCOUNTER (OUTPATIENT)
Dept: URBAN - METROPOLITAN AREA CLINIC 107 | Facility: CLINIC | Age: 72
End: 2022-11-09

## 2022-11-13 PROBLEM — 79922009: Status: ACTIVE | Noted: 2021-05-16

## 2022-11-21 ENCOUNTER — TELEPHONE ENCOUNTER (OUTPATIENT)
Dept: URBAN - METROPOLITAN AREA CLINIC 107 | Facility: CLINIC | Age: 72
End: 2022-11-21

## 2022-11-22 ENCOUNTER — TELEPHONE ENCOUNTER (OUTPATIENT)
Dept: URBAN - METROPOLITAN AREA CLINIC 113 | Facility: CLINIC | Age: 72
End: 2022-11-22

## 2023-04-26 ENCOUNTER — OFFICE VISIT (OUTPATIENT)
Dept: URBAN - METROPOLITAN AREA CLINIC 107 | Facility: CLINIC | Age: 73
End: 2023-04-26
Payer: MEDICARE

## 2023-04-26 VITALS
RESPIRATION RATE: 18 BRPM | SYSTOLIC BLOOD PRESSURE: 128 MMHG | TEMPERATURE: 96.8 F | WEIGHT: 223 LBS | HEIGHT: 63 IN | HEART RATE: 79 BPM | BODY MASS INDEX: 39.51 KG/M2 | DIASTOLIC BLOOD PRESSURE: 66 MMHG

## 2023-04-26 DIAGNOSIS — R10.13 EPIGASTRIC ABDOMINAL PAIN: ICD-10-CM

## 2023-04-26 DIAGNOSIS — R10.11 RUQ PAIN: ICD-10-CM

## 2023-04-26 DIAGNOSIS — R68.81 EARLY SATIETY: ICD-10-CM

## 2023-04-26 PROCEDURE — 99214 OFFICE O/P EST MOD 30 MIN: CPT | Performed by: INTERNAL MEDICINE

## 2023-04-26 RX ORDER — HYDROCHLOROTHIAZIDE 12.5 MG/1
CAPSULE ORAL
Qty: 30 | Refills: 0 | Status: ACTIVE | COMMUNITY
Start: 2011-12-13

## 2023-04-26 RX ORDER — LEVOTHYROXINE SODIUM 137 UG/1
1 TABLET IN THE MORNING ON AN EMPTY STOMACH TABLET ORAL ONCE A DAY
Status: ON HOLD | COMMUNITY

## 2023-04-26 RX ORDER — AZITHROMYCIN DIHYDRATE 250 MG/1
TAKE AS DIRECTED TABLET, FILM COATED ORAL
Qty: 6 | Refills: 0 | Status: ON HOLD | COMMUNITY
Start: 2011-06-14

## 2023-04-26 RX ORDER — ALBUTEROL SULFATE 90 UG/1
INHALE TWO PUFFS 4 TIMES A DAY AS NEEDED AEROSOL, METERED RESPIRATORY (INHALATION)
Qty: 9 | Refills: 0 | Status: ACTIVE | COMMUNITY
Start: 2011-06-14

## 2023-04-26 RX ORDER — NITROFURANTOIN MONOHYDRATE/MACROCRYSTALLINE 25; 75 MG/1; MG/1
CAPSULE ORAL
Qty: 14 | Refills: 0 | Status: ON HOLD | COMMUNITY
Start: 2011-04-24

## 2023-04-26 RX ORDER — LEVOFLOXACIN 500 MG/1
TAKE 1 TABLET BY MOUTH EVERY DAY TABLET, FILM COATED ORAL
Qty: 10 | Refills: 0 | Status: ON HOLD | COMMUNITY
Start: 2011-12-13

## 2023-04-26 RX ORDER — ATENOLOL 25 MG/1
1 TABLET TABLET ORAL ONCE A DAY
Status: ACTIVE | COMMUNITY

## 2023-04-26 RX ORDER — AMITRIPTYLINE HYDROCHLORIDE 10 MG/1
1 TABLET AT BEDTIME TABLET, FILM COATED ORAL ONCE A DAY
Qty: 30 | Refills: 3 | OUTPATIENT
Start: 2023-04-26

## 2023-04-26 RX ORDER — ESTERIFIED ESTROGENS AND METHYLTESTOSTERONE .625; 1.25 MG/1; MG/1
TAKE 1 TABLET DAILY TABLET ORAL
Refills: 0 | Status: ON HOLD | COMMUNITY
Start: 2016-02-29

## 2023-04-26 RX ORDER — ESOMEPRAZOLE MAGNESIUM 40 MG
CAPSULE,DELAYED RELEASE (ENTERIC COATED) ORAL
Qty: 30 | Refills: 0 | Status: ON HOLD | COMMUNITY
Start: 2011-06-14

## 2023-04-26 RX ORDER — PREDNISONE 20 MG/1
TABLET ORAL
Qty: 10 | Refills: 0 | Status: ON HOLD | COMMUNITY
Start: 2011-12-24

## 2023-04-26 RX ORDER — SODIUM SULFATE, POTASSIUM SULFATE, MAGNESIUM SULFATE 17.5; 3.13; 1.6 G/ML; G/ML; G/ML
USE AS DIRECTED SOLUTION, CONCENTRATE ORAL
Qty: 1 MILLILITER | Refills: 0 | Status: ON HOLD | COMMUNITY

## 2023-04-26 RX ORDER — HYOSCYAMINE SULFATE 0.12 MG/1
1 TABLET UNDER THE TONGUE AND ALLOW TO DISSOLVE AS NEEDED TABLET SUBLINGUAL
Qty: 60 | Refills: 3 | OUTPATIENT
Start: 2023-04-26 | End: 2023-12-21

## 2023-04-26 RX ORDER — EZETIMIBE 10 MG
1 TABLET TABLET ORAL ONCE A DAY
Status: ACTIVE | COMMUNITY

## 2023-04-26 RX ORDER — HYDROCHLOROTHIAZIDE 25 MG/1
TAKE 1 TABLET BY MOUTH EVERY DAY TABLET ORAL
Qty: 30 | Refills: 0 | Status: ACTIVE | COMMUNITY
Start: 2011-06-14

## 2023-04-26 RX ORDER — ALPRAZOLAM 0.25 MG/1
TABLET ORAL
Qty: 15 | Refills: 0 | Status: ACTIVE | COMMUNITY
Start: 2011-12-24

## 2023-04-26 RX ORDER — LEVOTHYROXINE SODIUM 125 UG/1
1 TABLET IN THE MORNING ON AN EMPTY STOMACH TABLET ORAL ONCE A DAY
Status: ACTIVE | COMMUNITY

## 2023-04-26 NOTE — HPI-TODAY'S VISIT:
Ms. Pineda is a 72-year-old female who was last seen here on 11/8/22.  She was previously seen here after a surveillance colonoscopy on May 14, 2021.  At that time, she had some intermittent epigastric abdominal pain which had improved recently.  Reflux symptoms were controlled on PPI therapy.  She made a distinction between the epigastric pain symptoms and the reflux symptoms, which did not seem to be the same process.  However, as her epigastric discomfort had resolved, she did not seek further evaluation of it.   Colonoscopy (4/20/2021): Diverticulosis in the sigmoid colon, in the descending colon and in the transverse colon.  The examined portion of the ileum was normal.  One 3 mm polyp in the transverse colon.Two 5 to 6 mm polyps in the transverse colon.  Nonbleeding internal hemorrhoids.  Normal examination on direct and retroflexion views. Biopsies notable for tubular adenomas. Repeat colonoscopy in 5 years.  She was seen on 8/30/22 with intermittent epigastric abdominal pain and early satiety.  She had an upper GI series performed at the insistence of Dr. Bravo which was reportedly negative.  She had an upper endoscopy in the distant past, not recently.  She had lost no weight.  She did complain of intermittent bouts of episodic tachycardia, associated with breathlessness.  Her heart rate has been in the 120s.  His episodes last for up to half an hour each.  She denied associated chest pain.  Neither of the long episodes that she noted in this regard were related to physical exertion.   After her last visit, she had labs done, including a complete metabolic panel, CBC, and lipase, all of which were apparently not drawn.  She had an upper endoscopy done on October 11, 2022 which showed a small hiatal hernia and gastritis.  Biopsies were negative for H. pylori.  She had some gastric polyps which were fundic gland polyps.  She had a gastric emptying study done on October 21, 2022 which showed mildly delayed gastric emptying, with 75% emptying in 3 hours.  Ath her 11/8/22 visit, the patient continued to be somewhat symptomatic.  She had no pain when she did not eat, but ingestion of vegetables and salads  increased her symptoms.  She did have her tachycardic episodes evaluated by cardiologist, although I do not have a report of this.  We did elect to proceed with an abdominal sonogram, which was done on 11/22/22 and revealed cholelithiasis, with a common bile duct 3 mm and no gallbladder wall thickening or pericholecystic fluid.  The patient called on 11/28/2022 with persistent complaints of postprandial epigastric and right upper quadrant pain, and was referred to Dr. Giovanni Cardenas for consideration of laparoscopic cholecystectomy.  The patient had a cholecystectomy and has been "up-and-down" since.  The epigastric pressure she had before is now gone, but if she eats what she termed a "normal" meal, she will feel bloated.  She has episodic abdominal discomfort, the last one being about 2 weeks ago.  Alcohol increases her symptoms.  She is taking a combination of katie, milk thistle, and tumeric, which seems to help her symptoms.  Sucralfate also helps with her epigastric discomfort.  She has rare diarrhea, and is taking Metamucil currently which seems to help somewhat.  Overall, she is better, and the severe discomfort she had in the past has not returned.

## 2023-05-04 LAB
A/G RATIO: 1.7
ALBUMIN: 4.1
ALKALINE PHOSPHATASE: 78
ALT (SGPT): 19
AST (SGOT): 16
BILIRUBIN, TOTAL: 0.4
BUN/CREATININE RATIO: (no result)
BUN: 16
CALCIUM: 9.3
CARBON DIOXIDE, TOTAL: 32
CHLORIDE: 101
CREATININE: 0.76
EGFR: 84
GLOBULIN, TOTAL: 2.4
GLUCOSE: 84
POTASSIUM: 4
PROTEIN, TOTAL: 6.5
SODIUM: 140

## 2023-08-24 ENCOUNTER — DASHBOARD ENCOUNTERS (OUTPATIENT)
Age: 73
End: 2023-08-24

## 2023-08-24 ENCOUNTER — OFFICE VISIT (OUTPATIENT)
Dept: URBAN - METROPOLITAN AREA CLINIC 107 | Facility: CLINIC | Age: 73
End: 2023-08-24
Payer: MEDICARE

## 2023-08-24 VITALS
BODY MASS INDEX: 39.87 KG/M2 | HEIGHT: 63 IN | RESPIRATION RATE: 18 BRPM | WEIGHT: 225 LBS | DIASTOLIC BLOOD PRESSURE: 73 MMHG | TEMPERATURE: 97.3 F | SYSTOLIC BLOOD PRESSURE: 112 MMHG | HEART RATE: 72 BPM

## 2023-08-24 DIAGNOSIS — R68.81 EARLY SATIETY: ICD-10-CM

## 2023-08-24 DIAGNOSIS — R10.13 EPIGASTRIC ABDOMINAL PAIN: ICD-10-CM

## 2023-08-24 DIAGNOSIS — R10.11 RUQ PAIN: ICD-10-CM

## 2023-08-24 PROCEDURE — 99213 OFFICE O/P EST LOW 20 MIN: CPT | Performed by: INTERNAL MEDICINE

## 2023-08-24 RX ORDER — ESOMEPRAZOLE MAGNESIUM 40 MG
CAPSULE,DELAYED RELEASE (ENTERIC COATED) ORAL
Qty: 30 | Refills: 0 | Status: ON HOLD | COMMUNITY
Start: 2011-06-14

## 2023-08-24 RX ORDER — ALPRAZOLAM 0.5 MG/1
1 TABLET TABLET ORAL AT BEDTIME
Refills: 0
Start: 2011-12-24

## 2023-08-24 RX ORDER — ALBUTEROL SULFATE 90 UG/1
INHALE TWO PUFFS 4 TIMES A DAY AS NEEDED AEROSOL, METERED RESPIRATORY (INHALATION)
Qty: 9 | Refills: 0 | Status: ACTIVE | COMMUNITY
Start: 2011-06-14

## 2023-08-24 RX ORDER — ATENOLOL 25 MG/1
1 TABLET TABLET ORAL ONCE A DAY
Status: ACTIVE | COMMUNITY

## 2023-08-24 RX ORDER — ESTERIFIED ESTROGENS AND METHYLTESTOSTERONE .625; 1.25 MG/1; MG/1
TAKE 1 TABLET DAILY TABLET ORAL
Refills: 0 | Status: ON HOLD | COMMUNITY
Start: 2016-02-29

## 2023-08-24 RX ORDER — LEVOFLOXACIN 500 MG/1
TAKE 1 TABLET BY MOUTH EVERY DAY TABLET, FILM COATED ORAL
Qty: 10 | Refills: 0 | Status: ON HOLD | COMMUNITY
Start: 2011-12-13

## 2023-08-24 RX ORDER — AMITRIPTYLINE HYDROCHLORIDE 10 MG/1
1 TABLET AT BEDTIME TABLET, FILM COATED ORAL ONCE A DAY
Qty: 30 | Refills: 3 | Status: ON HOLD | COMMUNITY
Start: 2023-04-26

## 2023-08-24 RX ORDER — LEVOTHYROXINE SODIUM 125 UG/1
1 TABLET IN THE MORNING ON AN EMPTY STOMACH TABLET ORAL ONCE A DAY
Status: ACTIVE | COMMUNITY

## 2023-08-24 RX ORDER — NITROFURANTOIN MONOHYDRATE/MACROCRYSTALLINE 25; 75 MG/1; MG/1
CAPSULE ORAL
Qty: 14 | Refills: 0 | Status: ON HOLD | COMMUNITY
Start: 2011-04-24

## 2023-08-24 RX ORDER — LEVOTHYROXINE SODIUM 137 UG/1
1 TABLET IN THE MORNING ON AN EMPTY STOMACH TABLET ORAL ONCE A DAY
Status: ON HOLD | COMMUNITY

## 2023-08-24 RX ORDER — EZETIMIBE 10 MG
1 TABLET TABLET ORAL ONCE A DAY
Status: ACTIVE | COMMUNITY

## 2023-08-24 RX ORDER — SODIUM SULFATE, POTASSIUM SULFATE, MAGNESIUM SULFATE 17.5; 3.13; 1.6 G/ML; G/ML; G/ML
USE AS DIRECTED SOLUTION, CONCENTRATE ORAL
Qty: 1 MILLILITER | Refills: 0 | Status: ON HOLD | COMMUNITY

## 2023-08-24 RX ORDER — HYOSCYAMINE SULFATE 0.12 MG/1
1 TABLET UNDER THE TONGUE AND ALLOW TO DISSOLVE AS NEEDED TABLET SUBLINGUAL
Qty: 60 | Refills: 3 | Status: ACTIVE | COMMUNITY
Start: 2023-04-26 | End: 2023-12-21

## 2023-08-24 RX ORDER — ALPRAZOLAM 0.25 MG/1
TABLET ORAL
Qty: 15 | Refills: 0 | Status: ACTIVE | COMMUNITY
Start: 2011-12-24

## 2023-08-24 RX ORDER — HYDROCHLOROTHIAZIDE 12.5 MG/1
CAPSULE ORAL
Qty: 30 | Refills: 0 | Status: ON HOLD | COMMUNITY
Start: 2011-12-13

## 2023-08-24 RX ORDER — PREDNISONE 20 MG/1
TABLET ORAL
Qty: 10 | Refills: 0 | Status: ON HOLD | COMMUNITY
Start: 2011-12-24

## 2023-08-24 RX ORDER — HYDROCHLOROTHIAZIDE 25 MG/1
TAKE 1 TABLET BY MOUTH EVERY DAY TABLET ORAL
Qty: 30 | Refills: 0 | Status: ACTIVE | COMMUNITY
Start: 2011-06-14

## 2023-08-24 RX ORDER — AZITHROMYCIN DIHYDRATE 250 MG/1
TAKE AS DIRECTED TABLET, FILM COATED ORAL
Qty: 6 | Refills: 0 | Status: ON HOLD | COMMUNITY
Start: 2011-06-14

## 2023-08-24 NOTE — HPI-TODAY'S VISIT:
Ms. Pineda is a 72-year-old female who was last seen here on 4/26/23.  She was previously seen here after a surveillance colonoscopy on May 14, 2021.  At that time, she had some intermittent epigastric abdominal pain which had improved recently.  Reflux symptoms were controlled on PPI therapy.  She made a distinction between the epigastric pain symptoms and the reflux symptoms, which did not seem to be the same process.  However, as her epigastric discomfort had resolved, she did not seek further evaluation of it.   Colonoscopy (4/20/2021): Diverticulosis in the sigmoid colon, in the descending colon and in the transverse colon.  The examined portion of the ileum was normal.  One 3 mm polyp in the transverse colon.Two 5 to 6 mm polyps in the transverse colon.  Nonbleeding internal hemorrhoids.  Normal examination on direct and retroflexion views. Biopsies notable for tubular adenomas. Repeat colonoscopy in 5 years.  She was seen on 8/30/22 with intermittent epigastric abdominal pain and early satiety.  She had an upper GI series performed at the insistence of Dr. Bravo which was reportedly negative.  She had an upper endoscopy in the distant past, not recently.  She had lost no weight.  She did complain of intermittent bouts of episodic tachycardia, associated with breathlessness.  Her heart rate had been in the 120s.  Episodes lasted for up to half an hour each.  She denied associated chest pain.  Neither of the long episodes that she noted in this regard were related to physical exertion.   After her 8/30/22 visit, she had labs ordered, including a complete metabolic panel, CBC, and lipase, all of which were apparently not drawn.  She had an upper endoscopy done on October 11, 2022 which showed a small hiatal hernia and gastritis.  Biopsies were negative for H. pylori.  She had some fundic gland gastric polyps.  A gastric emptying study done on October 21, 2022 showed mildly delayed gastric emptying, with 75% emptying in 3 hours.  At her 11/8/22 visit, the patient continued to be somewhat symptomatic.  She had no pain when she did not eat, but ingestion of vegetables and salads  increased her symptoms.  She did have her tachycardic episodes evaluated by cardiologist, although I do not have a report of this.  We did elect to proceed with an abdominal sonogram, which was done on 11/22/22 and revealed cholelithiasis, with a common bile duct 3 mm and no gallbladder wall thickening or pericholecystic fluid.  The patient called on 11/28/2022 with persistent complaints of postprandial epigastric and right upper quadrant pain, and was referred to Dr. Giovanni Cardenas for consideration of laparoscopic cholecystectomy.  The patient had a cholecystectomy and was "up-and-down" afterwards.  The epigastric pressure she had before was gone, but if she ate what she termed a "normal" meal, she will feel bloated.  She still described episodic abdominal discomfort.  Alcohol increasds her symptoms.  She was taking a combination of ginger, milk thistle, and tumeric, which seemed to help her symptoms.  Sucralfate also helped with her epigastric discomfort.  She had rare diarrhea, and was taking Metamucil which seemed to help somewhat.  Overall, she was better, and the severe discomfort she had in the past had not returned.  After her 4/26/2023 visit, we elected to continue her on Carafate and hyoscyamine, but placed her on amitriptyline 10 mg nightly.  She also had repeat liver enzymes checked to rule out a retained common bile duct gallstone, although this is felt to be of low likelihood.  She returns today for follow-up.  Her CMP on 4/26/23 was normal.  The patient is "better."  She cannot tolerate amitriptyline because of musculoskeletal pain in her arms.  However, she has found that taking Xanax every evening will allow her to sleep, and this has helped with her dyspeptic complaints.  She has had no further episodes of right upper quadrant pain, and denies early satiety, etc.  She does maintain the use of as needed hyoscyamine at present.